# Patient Record
Sex: FEMALE
[De-identification: names, ages, dates, MRNs, and addresses within clinical notes are randomized per-mention and may not be internally consistent; named-entity substitution may affect disease eponyms.]

---

## 2019-03-05 ENCOUNTER — HOSPITAL ENCOUNTER (EMERGENCY)
Dept: HOSPITAL 94 - ER | Age: 29
Discharge: LEFT BEFORE DISCHARGE | End: 2019-03-05
Payer: COMMERCIAL

## 2019-03-05 VITALS — HEIGHT: 66 IN | WEIGHT: 134.48 LBS | BODY MASS INDEX: 21.61 KG/M2

## 2019-03-05 VITALS — SYSTOLIC BLOOD PRESSURE: 145 MMHG | DIASTOLIC BLOOD PRESSURE: 82 MMHG

## 2019-03-05 DIAGNOSIS — R11.0: ICD-10-CM

## 2019-03-05 DIAGNOSIS — R47.81: ICD-10-CM

## 2019-03-05 DIAGNOSIS — R07.89: ICD-10-CM

## 2019-03-05 DIAGNOSIS — R00.0: Primary | ICD-10-CM

## 2019-03-05 DIAGNOSIS — R42: ICD-10-CM

## 2019-03-05 DIAGNOSIS — Z53.21: ICD-10-CM

## 2019-03-05 DIAGNOSIS — R06.02: ICD-10-CM

## 2019-03-05 DIAGNOSIS — R20.0: ICD-10-CM

## 2019-03-05 DIAGNOSIS — G43.909: ICD-10-CM

## 2019-03-05 NOTE — NUR
NO RESPONSE FROM LOBBY AFTER 3 ATTEMPTS TO ROOM. CALL PLACED TO NUMBER ON FILE, 
MESSAGE LEFT EXPRESSING CONCERN FOR PATIENTS WELL BEING, AND TO ENCOURAGE HER 
TO RETURN FOR EVAL. ED PHONE # AND CONTACT INFO PROVIDED IN MESSAGE. DR PINTO INFORMED.